# Patient Record
Sex: MALE | Race: WHITE | NOT HISPANIC OR LATINO | Employment: OTHER | URBAN - METROPOLITAN AREA
[De-identification: names, ages, dates, MRNs, and addresses within clinical notes are randomized per-mention and may not be internally consistent; named-entity substitution may affect disease eponyms.]

---

## 2017-11-25 ENCOUNTER — APPOINTMENT (OUTPATIENT)
Dept: GENERAL RADIOLOGY | Facility: HOSPITAL | Age: 62
End: 2017-11-25

## 2017-11-25 ENCOUNTER — HOSPITAL ENCOUNTER (EMERGENCY)
Facility: HOSPITAL | Age: 62
Discharge: HOME | End: 2017-11-26
Attending: EMERGENCY MEDICINE | Admitting: EMERGENCY MEDICINE

## 2017-11-25 DIAGNOSIS — S29.011A MUSCLE STRAIN OF CHEST WALL, INITIAL ENCOUNTER: Primary | ICD-10-CM

## 2017-11-25 PROCEDURE — 71101 X-RAY EXAM UNILAT RIBS/CHEST: CPT

## 2017-11-25 PROCEDURE — 99283 EMERGENCY DEPT VISIT LOW MDM: CPT

## 2017-11-25 RX ORDER — HYDROXYZINE PAMOATE 50 MG/1
100 CAPSULE ORAL 3 TIMES DAILY PRN
COMMUNITY

## 2017-11-25 RX ORDER — LOVASTATIN 40 MG/1
40 TABLET ORAL DAILY
COMMUNITY
Start: 2016-10-08

## 2017-11-25 RX ORDER — LAMOTRIGINE 100 MG/1
50 TABLET ORAL DAILY
COMMUNITY

## 2017-11-25 RX ORDER — QUETIAPINE FUMARATE 100 MG/1
200 TABLET, FILM COATED ORAL NIGHTLY
COMMUNITY

## 2017-11-25 RX ORDER — KETOCONAZOLE 20 MG/ML
1 SHAMPOO TOPICAL EVERY OTHER DAY
COMMUNITY
Start: 2016-10-25

## 2017-11-25 RX ORDER — DICYCLOMINE HYDROCHLORIDE 10 MG/1
10 CAPSULE ORAL 3 TIMES DAILY
COMMUNITY

## 2017-11-25 RX ORDER — OMEPRAZOLE 40 MG/1
40 CAPSULE, DELAYED RELEASE ORAL DAILY
COMMUNITY

## 2017-11-25 RX ORDER — FLUOROMETHOLONE 0.1 %
1 SUSPENSION, DROPS(FINAL DOSAGE FORM)(ML) OPHTHALMIC (EYE) DAILY
COMMUNITY
Start: 2016-08-11

## 2017-11-26 VITALS
HEART RATE: 72 BPM | HEIGHT: 68 IN | SYSTOLIC BLOOD PRESSURE: 108 MMHG | WEIGHT: 210 LBS | DIASTOLIC BLOOD PRESSURE: 69 MMHG | BODY MASS INDEX: 31.83 KG/M2 | TEMPERATURE: 98.3 F | OXYGEN SATURATION: 97 % | RESPIRATION RATE: 18 BRPM

## 2017-11-26 LAB
ATMOSPHERIC PRESS: 754.7 MMHG
BASE EXCESS BLDV CALC-SCNC: 7.4 MMOL/L
BDY SITE: ABNORMAL
GAS FLOW AIRWAY: 3 LPM
HCO3 BLDV-SCNC: 33 MMOL/L (ref 22–28)
MODALITY: ABNORMAL
PCO2 BLDV: 51.5 MM HG (ref 41–51)
PH BLDV: 7.42 PH UNITS (ref 7.31–7.41)
PO2 BLDV: 45 MM HG (ref 35–45)
SAO2 % BLDCOA: 80.3 % (ref 92–99)
TOTAL RATE: 23 BREATHS/MINUTE

## 2017-11-26 PROCEDURE — 82803 BLOOD GASES ANY COMBINATION: CPT

## 2017-11-26 RX ORDER — METHOCARBAMOL 750 MG/1
750 TABLET, FILM COATED ORAL 3 TIMES DAILY
Qty: 20 TABLET | Refills: 0 | Status: SHIPPED | OUTPATIENT
Start: 2017-11-26 | End: 2023-02-14

## 2017-11-26 NOTE — ED PROVIDER NOTES
"Pt presents to the ED complainign of R lower rib pain,w hcih began around 2100 today. Pt states that the pain began after he was reaching over a table when he heard a \"pop\" in his R ribs. Pt's R rib XR shows nothing acute. On exam, the pt has tenderness along the R costal margin. I agree with the plan to discharge the pt home with an incentive spirometer and a prescription for pain medication.     I supervised care provided by the midlevel provider.    We have discussed this patient's history, physical exam, and treatment plan.   I have reviewed the note and personally saw and examined the patient and agree with the plan of care.    Documentation assistance provided by emely Farmer for Dr. Cope.  Information recorded by the mauricioibe was done at my direction and has been verified and validated by me.       Shelli Farmer  11/26/17 0015       Garrett Cope MD  11/26/17 0327    "

## 2017-11-26 NOTE — DISCHARGE INSTRUCTIONS
Return to the ER with any further concerns or should your condition change or worsen.  Incentive spirometry as discussed.

## 2017-11-26 NOTE — ED PROVIDER NOTES
" EMERGENCY DEPARTMENT ENCOUNTER    CHIEF COMPLAINT  Chief Complaint: Rib pain  History given by: Patient  History limited by:   Room Number: 03/03  PMD: Arabella Valdez MD      HPI:  Pt is a 62 y.o. male who presents complaining of R sided rib pain that onset while lying down and attempting to stretch today at 2100. He reports hearing an audible \"pop\" sound when stretching followed by pain. He states the pain has improved since ED arrival, but originally had pain with movement and with deep breathing. Pt denies any cardiac pain.    Duration: 2 hours  Onset: sudden  Timing: constant  Location: R ribs  Radiation: none  Quality: \"pain\"  Intensity/Severity: moderate  Progression: improving  Associated Symptoms: none  Aggravating Factors: movement, deep breathing  Alleviating Factors: none  Previous Episodes: none  Treatment before arrival: none    PAST MEDICAL HISTORY  Active Ambulatory Problems     Diagnosis Date Noted   • No Active Ambulatory Problems     Resolved Ambulatory Problems     Diagnosis Date Noted   • No Resolved Ambulatory Problems     Past Medical History:   Diagnosis Date   • Diabetes 1.5, managed as type 2    • GERD (gastroesophageal reflux disease)    • Hyperlipidemia    • Hypertension    • Intercostal neuritis        PAST SURGICAL HISTORY  Past Surgical History:   Procedure Laterality Date   • BACK SURGERY     • CYST REMOVAL     • SINUS SURGERY      X3       FAMILY HISTORY  Family History   Problem Relation Age of Onset   • Diabetes Father    • Heart failure Father    • Cancer Sister    • Cancer Paternal Grandfather        SOCIAL HISTORY  Social History     Social History   • Marital status: Single     Spouse name: N/A   • Number of children: N/A   • Years of education: N/A     Occupational History   • Not on file.     Social History Main Topics   • Smoking status: Never Smoker   • Smokeless tobacco: Never Used   • Alcohol use No   • Drug use: No   • Sexual activity: Not on file     Other Topics " Concern   • Not on file     Social History Narrative       ALLERGIES  Flexeril [cyclobenzaprine]; Penicillins; and Sulfa antibiotics    REVIEW OF SYSTEMS  Review of Systems   Constitutional: Negative for fever.   Respiratory: Negative for shortness of breath.    Cardiovascular: Negative for chest pain.   Musculoskeletal: Positive for arthralgias (R ribs).       PHYSICAL EXAM  ED Triage Vitals   Temp Heart Rate Resp BP SpO2   11/25/17 2201 11/25/17 2201 11/25/17 2201 11/25/17 2218 11/25/17 2201   98.2 °F (36.8 °C) 92 18 134/69 99 %      Temp src Heart Rate Source Patient Position BP Location FiO2 (%)   11/25/17 2201 11/25/17 2201 -- -- --   Tympanic Monitor          Physical Exam   Constitutional: No distress.   HENT:   Head: Normocephalic and atraumatic.   Eyes: EOM are normal.   Neck: Normal range of motion.   Cardiovascular: Normal heart sounds.    Pulmonary/Chest: No respiratory distress. He exhibits tenderness (R anterior lateral ribs).   Abdominal: There is no tenderness.   Musculoskeletal: He exhibits no edema.   Neurological: He is alert.   Skin: Skin is warm and dry.   Nursing note and vitals reviewed.    RADIOLOGY  XR Ribs Right With PA Chest   Final Result   Negative right rib series.       This report was finalized on 11/25/2017 10:55 PM by Lucho Bravo MD.               I ordered the above noted radiological studies. Interpreted by radiologist.Reviewed by me in PACS.       PROCEDURES  Procedures      PROGRESS AND CONSULTS  ED Course   12:00 AM  Discussed with pt about how I feel this is most likely a chest wall strain. Will review the X-ray and if unremarkable, will discharge. Pt understands and agrees with plan. All concerns addressed.  12:09 AM  Reviewed pt's history and workup with Dr. Cope.  After a bedside evaluation; Dr Cope agrees with the plan of care          MEDICAL DECISION MAKING  Results were reviewed/discussed with the patient and they were also made aware of online access. Pt also made  aware that some labs, such as cultures, will not be resulted during ER visit and follow up with PMD is necessary.     MDM       DIAGNOSIS  Final diagnoses:   Muscle strain of chest wall, initial encounter       DISPOSITION  DISCHARGE    Patient discharged in stable condition.    Reviewed implications of results, diagnosis, meds, responsibility to follow up, warning signs and symptoms of possible worsening, potential complications and reasons to return to ER.    Patient/Family voiced understanding of above instructions.    Discussed plan for discharge, as there is no emergent indication for admission.  Pt/family is agreeable and understands need for follow up and repeat testing.  Pt is aware that discharge does not mean that nothing is wrong but it indicates no emergency is present that requires admission and they must continue care with follow-up as given below or physician of their choice.     FOLLOW-UP  Arabella Valdez MD  [address] [address]  [phone]    Call in 2 days  For Primary Physician follow-up, If symptoms worsen         Medication List      New Prescriptions          methocarbamol 750 MG tablet   Commonly known as:  ROBAXIN   Take 1 tablet by mouth 3 (Three) Times a Day.               Latest Documented Vital Signs:  As of 4:35 AM  BP- 108/69 HR- 72 Temp- 98.3 °F (36.8 °C) (Oral) O2 sat- 97%    --  Documentation assistance provided by emely Walters for Santo Villegas PA-C.  Information recorded by the scribe was done at my direction and has been verified and validated by me.     Prosper Walters  11/26/17 0010       JUNI Mitchell III  11/26/17 0435

## 2018-12-05 ENCOUNTER — TRANSCRIBE ORDERS (OUTPATIENT)
Dept: ADMINISTRATIVE | Facility: HOSPITAL | Age: 63
End: 2018-12-05

## 2018-12-05 ENCOUNTER — HOSPITAL ENCOUNTER (OUTPATIENT)
Dept: GENERAL RADIOLOGY | Facility: HOSPITAL | Age: 63
Discharge: HOME | End: 2018-12-05
Attending: UROLOGY

## 2018-12-05 ENCOUNTER — HOSPITAL ENCOUNTER (OUTPATIENT)
Dept: GENERAL RADIOLOGY | Facility: HOSPITAL | Age: 63
Discharge: HOME | End: 2018-12-05
Admitting: FAMILY MEDICINE

## 2018-12-05 DIAGNOSIS — Z87.442 PERSONAL HISTORY OF URINARY CALCULI: Primary | ICD-10-CM

## 2018-12-05 DIAGNOSIS — Z87.442 PERSONAL HISTORY OF URINARY CALCULI: ICD-10-CM

## 2018-12-05 DIAGNOSIS — M79.674 PAIN OF TOE OF RIGHT FOOT: Primary | ICD-10-CM

## 2018-12-05 DIAGNOSIS — M79.674 PAIN OF TOE OF RIGHT FOOT: ICD-10-CM

## 2018-12-05 PROCEDURE — 73630 X-RAY EXAM OF FOOT: CPT

## 2018-12-05 PROCEDURE — 74018 RADEX ABDOMEN 1 VIEW: CPT

## 2019-01-23 ENCOUNTER — TRANSCRIBE ORDERS (OUTPATIENT)
Dept: ADMINISTRATIVE | Facility: HOSPITAL | Age: 64
End: 2019-01-23

## 2019-01-23 DIAGNOSIS — R19.7 DIARRHEA, UNSPECIFIED TYPE: ICD-10-CM

## 2019-01-23 DIAGNOSIS — R63.4 LOSS OF WEIGHT: ICD-10-CM

## 2019-01-23 DIAGNOSIS — R10.9 STOMACH ACHE: Primary | ICD-10-CM

## 2021-03-22 ENCOUNTER — BULK ORDERING (OUTPATIENT)
Dept: CASE MANAGEMENT | Facility: OTHER | Age: 66
End: 2021-03-22

## 2021-03-22 DIAGNOSIS — Z23 IMMUNIZATION DUE: ICD-10-CM

## 2023-01-20 ENCOUNTER — HOSPITAL ENCOUNTER (EMERGENCY)
Facility: HOSPITAL | Age: 68
Discharge: HOME | End: 2023-01-20
Attending: EMERGENCY MEDICINE | Admitting: EMERGENCY MEDICINE
Payer: MEDICARE

## 2023-01-20 ENCOUNTER — APPOINTMENT (OUTPATIENT)
Dept: CT IMAGING | Facility: HOSPITAL | Age: 68
End: 2023-01-20
Payer: MEDICARE

## 2023-01-20 VITALS
HEART RATE: 72 BPM | TEMPERATURE: 97.5 F | RESPIRATION RATE: 16 BRPM | DIASTOLIC BLOOD PRESSURE: 95 MMHG | BODY MASS INDEX: 25.76 KG/M2 | WEIGHT: 170 LBS | HEIGHT: 68 IN | OXYGEN SATURATION: 90 % | SYSTOLIC BLOOD PRESSURE: 126 MMHG

## 2023-01-20 DIAGNOSIS — N35.919 STRICTURE OF MALE URETHRA, UNSPECIFIED STRICTURE TYPE: Primary | ICD-10-CM

## 2023-01-20 LAB
ALBUMIN SERPL-MCNC: 4.7 G/DL (ref 3.5–5.2)
ALBUMIN/GLOB SERPL: 2.5 G/DL
ALP SERPL-CCNC: 60 U/L (ref 39–117)
ALT SERPL W P-5'-P-CCNC: 43 U/L (ref 1–41)
ANION GAP SERPL CALCULATED.3IONS-SCNC: 13.5 MMOL/L (ref 5–15)
AST SERPL-CCNC: 31 U/L (ref 1–40)
BACTERIA UR QL AUTO: NORMAL /HPF
BASOPHILS # BLD AUTO: 0.02 10*3/MM3 (ref 0–0.2)
BASOPHILS NFR BLD AUTO: 0.3 % (ref 0–1.5)
BILIRUB SERPL-MCNC: 0.5 MG/DL (ref 0–1.2)
BILIRUB UR QL STRIP: NEGATIVE
BUN SERPL-MCNC: 28 MG/DL (ref 8–23)
BUN/CREAT SERPL: 23.9 (ref 7–25)
CALCIUM SPEC-SCNC: 9.8 MG/DL (ref 8.6–10.5)
CHLORIDE SERPL-SCNC: 108 MMOL/L (ref 98–107)
CLARITY UR: CLEAR
CO2 SERPL-SCNC: 21.5 MMOL/L (ref 22–29)
COLOR UR: ABNORMAL
CREAT SERPL-MCNC: 1.17 MG/DL (ref 0.76–1.27)
DEPRECATED RDW RBC AUTO: 46.9 FL (ref 37–54)
EGFRCR SERPLBLD CKD-EPI 2021: 67.9 ML/MIN/1.73
EOSINOPHIL # BLD AUTO: 0.16 10*3/MM3 (ref 0–0.4)
EOSINOPHIL NFR BLD AUTO: 2 % (ref 0.3–6.2)
ERYTHROCYTE [DISTWIDTH] IN BLOOD BY AUTOMATED COUNT: 13.7 % (ref 12.3–15.4)
GLOBULIN UR ELPH-MCNC: 1.9 GM/DL
GLUCOSE SERPL-MCNC: 109 MG/DL (ref 65–99)
GLUCOSE UR STRIP-MCNC: NEGATIVE MG/DL
HCT VFR BLD AUTO: 40.4 % (ref 37.5–51)
HGB BLD-MCNC: 13.5 G/DL (ref 13–17.7)
HGB UR QL STRIP.AUTO: NEGATIVE
HYALINE CASTS UR QL AUTO: NORMAL /LPF
KETONES UR QL STRIP: NEGATIVE
LEUKOCYTE ESTERASE UR QL STRIP.AUTO: NEGATIVE
LYMPHOCYTES # BLD AUTO: 1.77 10*3/MM3 (ref 0.7–3.1)
LYMPHOCYTES NFR BLD AUTO: 22.5 % (ref 19.6–45.3)
MCH RBC QN AUTO: 31.3 PG (ref 26.6–33)
MCHC RBC AUTO-ENTMCNC: 33.4 G/DL (ref 31.5–35.7)
MCV RBC AUTO: 93.7 FL (ref 79–97)
MONOCYTES # BLD AUTO: 0.6 10*3/MM3 (ref 0.1–0.9)
MONOCYTES NFR BLD AUTO: 7.6 % (ref 5–12)
NEUTROPHILS NFR BLD AUTO: 5.27 10*3/MM3 (ref 1.7–7)
NEUTROPHILS NFR BLD AUTO: 67.2 % (ref 42.7–76)
NITRITE UR QL STRIP: POSITIVE
PH UR STRIP.AUTO: 5.5 [PH] (ref 5–8)
PLATELET # BLD AUTO: 142 10*3/MM3 (ref 140–450)
PMV BLD AUTO: 10.9 FL (ref 6–12)
POTASSIUM SERPL-SCNC: 4.3 MMOL/L (ref 3.5–5.2)
PROT SERPL-MCNC: 6.6 G/DL (ref 6–8.5)
PROT UR QL STRIP: ABNORMAL
RBC # BLD AUTO: 4.31 10*6/MM3 (ref 4.14–5.8)
RBC # UR STRIP: NORMAL /HPF
REF LAB TEST METHOD: NORMAL
SODIUM SERPL-SCNC: 143 MMOL/L (ref 136–145)
SP GR UR STRIP: >=1.03 (ref 1–1.03)
SQUAMOUS #/AREA URNS HPF: NORMAL /HPF
UROBILINOGEN UR QL STRIP: ABNORMAL
WBC # UR STRIP: NORMAL /HPF
WBC NRBC COR # BLD: 7.85 10*3/MM3 (ref 3.4–10.8)

## 2023-01-20 PROCEDURE — 74176 CT ABD & PELVIS W/O CONTRAST: CPT

## 2023-01-20 PROCEDURE — 63710000001 ONDANSETRON ODT 4 MG TABLET DISPERSIBLE: Performed by: EMERGENCY MEDICINE

## 2023-01-20 PROCEDURE — P9612 CATHETERIZE FOR URINE SPEC: HCPCS

## 2023-01-20 PROCEDURE — 85025 COMPLETE CBC W/AUTO DIFF WBC: CPT | Performed by: EMERGENCY MEDICINE

## 2023-01-20 PROCEDURE — 99284 EMERGENCY DEPT VISIT MOD MDM: CPT

## 2023-01-20 PROCEDURE — 51798 US URINE CAPACITY MEASURE: CPT

## 2023-01-20 PROCEDURE — 80053 COMPREHEN METABOLIC PANEL: CPT | Performed by: EMERGENCY MEDICINE

## 2023-01-20 PROCEDURE — 81001 URINALYSIS AUTO W/SCOPE: CPT | Performed by: EMERGENCY MEDICINE

## 2023-01-20 RX ORDER — ONDANSETRON 4 MG/1
4 TABLET, ORALLY DISINTEGRATING ORAL ONCE
Status: COMPLETED | OUTPATIENT
Start: 2023-01-20 | End: 2023-01-20

## 2023-01-20 RX ORDER — LIDOCAINE HYDROCHLORIDE 20 MG/ML
JELLY TOPICAL ONCE
Status: COMPLETED | OUTPATIENT
Start: 2023-01-20 | End: 2023-01-20

## 2023-01-20 RX ORDER — TRAMADOL HYDROCHLORIDE 50 MG/1
50 TABLET ORAL EVERY 8 HOURS PRN
Qty: 12 TABLET | Refills: 0 | Status: SHIPPED | OUTPATIENT
Start: 2023-01-20 | End: 2023-02-14

## 2023-01-20 RX ORDER — HYDROCODONE BITARTRATE AND ACETAMINOPHEN 7.5; 325 MG/1; MG/1
1 TABLET ORAL ONCE
Status: COMPLETED | OUTPATIENT
Start: 2023-01-20 | End: 2023-01-20

## 2023-01-20 RX ORDER — LIDOCAINE HYDROCHLORIDE 20 MG/ML
JELLY TOPICAL ONCE
Status: DISCONTINUED | OUTPATIENT
Start: 2023-01-20 | End: 2023-01-20

## 2023-01-20 RX ADMIN — LIDOCAINE HYDROCHLORIDE: 20 JELLY TOPICAL at 17:43

## 2023-01-20 RX ADMIN — HYDROCODONE BITARTRATE AND ACETAMINOPHEN 1 TABLET: 7.5; 325 TABLET ORAL at 16:09

## 2023-01-20 RX ADMIN — ONDANSETRON 4 MG: 4 TABLET, ORALLY DISINTEGRATING ORAL at 16:10

## 2023-01-20 NOTE — ED PROVIDER NOTES
EMERGENCY DEPARTMENT ENCOUNTER    Room Number:  32/32  Date seen:  1/20/2023  PCP: Arabella Valdez MD  Historian: Patient      HPI:  Chief Complaint: Penile pain    A complete HPI/ROS/PMH/PSH/SH/FH are unobtainable due to: N/A    Context: Mega Mack is a 67 y.o. male who presents to the ED c/o penile and right inguinal pain for the past 3 weeks.  Symptoms of gotten progressively worse over the past few days.  He reports that he has dribbling of urination with frequency and dysuria.  His urine has been clear-no hematuria or dark urine.  No fevers or chills.  No nausea, vomiting or diarrhea.  He has had similar problems in the past when he has had kidney stones.      PAST MEDICAL HISTORY  Active Ambulatory Problems     Diagnosis Date Noted   • No Active Ambulatory Problems     Resolved Ambulatory Problems     Diagnosis Date Noted   • No Resolved Ambulatory Problems     Past Medical History:   Diagnosis Date   • Diabetes 1.5, managed as type 2 (HCC)    • GERD (gastroesophageal reflux disease)    • Hyperlipidemia    • Hypertension    • Intercostal neuritis          PAST SURGICAL HISTORY  Past Surgical History:   Procedure Laterality Date   • APPENDECTOMY  03/20/2019   • BACK SURGERY     • CYST REMOVAL     • SINUS SURGERY      X3         FAMILY HISTORY  Family History   Problem Relation Age of Onset   • Diabetes Father    • Heart failure Father    • Cancer Sister    • Cancer Paternal Grandfather          SOCIAL HISTORY  Social History     Socioeconomic History   • Marital status: Single   Tobacco Use   • Smoking status: Never   • Smokeless tobacco: Never   Substance and Sexual Activity   • Alcohol use: No   • Drug use: No         ALLERGIES  Sulfamethoxazole-trimethoprim, Dyphylline, Flexeril [cyclobenzaprine], Penicillins, and Sulfa antibiotics        REVIEW OF SYSTEMS  Review of Systems   Constitutional: Negative for fever.   Respiratory: Negative for shortness of breath.    Cardiovascular: Negative for chest  pain.   Gastrointestinal: Negative for diarrhea and vomiting.   Genitourinary: Positive for frequency, penile pain and urgency. Negative for flank pain and hematuria.            PHYSICAL EXAM  ED Triage Vitals [01/20/23 1541]   Temp Heart Rate Resp BP SpO2   97.5 °F (36.4 °C) 78 18 (!) 181/95 99 %      Temp src Heart Rate Source Patient Position BP Location FiO2 (%)   -- Monitor -- -- --       Physical Exam      Physical Exam   Constitutional: Pt. is oriented to person, place, and time.  He is well-developed, well-nourished, and in no distress.    HENT: Normocephalic and atraumatic. Pupils are equal, round, and reactive to light.   Neck: Normal range of motion. Neck supple. No JVD present. No tracheal deviation present.   Cardiovascular: Normal rate, regular rhythm and normal heart sounds.   Pulmonary/Chest: Effort normal and breath sounds normal. No stridor. No respiratory distress. No wheezes, no rales.   Abdominal: Soft.  No distension. There is no tenderness. There is no rebound and no guarding.   Musculoskeletal: No edema, tenderness or deformity.   Neurological: He is alert and oriented to person, place, and time.  He has no focal neurologic deficits.  Skin: Skin is warm and dry. Pt. is not diaphoretic.  Psychiatric: Mood, affect normal.  He is pleasant and cooperative.  Nursing note and vitals reviewed.            LAB RESULTS  Recent Results (from the past 24 hour(s))   POC Urinalysis Dipstick, Multipro (Automated Dipstick)    Collection Time: 01/20/23 12:56 PM    Specimen: Urine   Result Value Ref Range    Color Yellow     Clarity, UA Cloudy (A)     Glucose, UA Negative mg/dL    Bilirubin Negative     Ketones, UA Negative     Specific Gravity  1.030 1.005 - 1.030    Blood, UA Trace (A)     pH, Urine 5.5 5.0 - 8.0    Protein, POC 30 mg/dL (A) mg/dL    Urobilinogen, UA 0.2 E.U./dL     Nitrite, UA Negative     Leukocytes Negative    Comprehensive Metabolic Panel    Collection Time: 01/20/23  4:04 PM     Specimen: Blood   Result Value Ref Range    Glucose 109 (H) 65 - 99 mg/dL    BUN 28 (H) 8 - 23 mg/dL    Creatinine 1.17 0.76 - 1.27 mg/dL    Sodium 143 136 - 145 mmol/L    Potassium 4.3 3.5 - 5.2 mmol/L    Chloride 108 (H) 98 - 107 mmol/L    CO2 21.5 (L) 22.0 - 29.0 mmol/L    Calcium 9.8 8.6 - 10.5 mg/dL    Total Protein 6.6 6.0 - 8.5 g/dL    Albumin 4.7 3.5 - 5.2 g/dL    ALT (SGPT) 43 (H) 1 - 41 U/L    AST (SGOT) 31 1 - 40 U/L    Alkaline Phosphatase 60 39 - 117 U/L    Total Bilirubin 0.5 0.0 - 1.2 mg/dL    Globulin 1.9 gm/dL    A/G Ratio 2.5 g/dL    BUN/Creatinine Ratio 23.9 7.0 - 25.0    Anion Gap 13.5 5.0 - 15.0 mmol/L    eGFR 67.9 >60.0 mL/min/1.73   CBC Auto Differential    Collection Time: 01/20/23  4:04 PM    Specimen: Blood   Result Value Ref Range    WBC 7.85 3.40 - 10.80 10*3/mm3    RBC 4.31 4.14 - 5.80 10*6/mm3    Hemoglobin 13.5 13.0 - 17.7 g/dL    Hematocrit 40.4 37.5 - 51.0 %    MCV 93.7 79.0 - 97.0 fL    MCH 31.3 26.6 - 33.0 pg    MCHC 33.4 31.5 - 35.7 g/dL    RDW 13.7 12.3 - 15.4 %    RDW-SD 46.9 37.0 - 54.0 fl    MPV 10.9 6.0 - 12.0 fL    Platelets 142 140 - 450 10*3/mm3    Neutrophil % 67.2 42.7 - 76.0 %    Lymphocyte % 22.5 19.6 - 45.3 %    Monocyte % 7.6 5.0 - 12.0 %    Eosinophil % 2.0 0.3 - 6.2 %    Basophil % 0.3 0.0 - 1.5 %    Neutrophils, Absolute 5.27 1.70 - 7.00 10*3/mm3    Lymphocytes, Absolute 1.77 0.70 - 3.10 10*3/mm3    Monocytes, Absolute 0.60 0.10 - 0.90 10*3/mm3    Eosinophils, Absolute 0.16 0.00 - 0.40 10*3/mm3    Basophils, Absolute 0.02 0.00 - 0.20 10*3/mm3   Urinalysis With Culture If Indicated - Urine, Clean Catch    Collection Time: 01/20/23  4:09 PM    Specimen: Urine, Clean Catch   Result Value Ref Range    Color, UA Dark Yellow (A) Yellow, Straw    Appearance, UA Clear Clear    pH, UA 5.5 5.0 - 8.0    Specific Gravity, UA >=1.030 1.005 - 1.030    Glucose, UA Negative Negative    Ketones, UA Negative Negative    Bilirubin, UA Negative Negative    Blood, UA Negative Negative     Protein, UA Trace (A) Negative    Leuk Esterase, UA Negative Negative    Nitrite, UA Positive (A) Negative    Urobilinogen, UA 1.0 E.U./dL 0.2 - 1.0 E.U./dL   Urinalysis, Microscopic Only - Urine, Clean Catch    Collection Time: 01/20/23  4:09 PM    Specimen: Urine, Clean Catch   Result Value Ref Range    RBC, UA None Seen None Seen, 0-2 /HPF    WBC, UA 0-2 None Seen, 0-2 /HPF    Bacteria, UA None Seen None Seen /HPF    Squamous Epithelial Cells, UA 0-2 None Seen, 0-2 /HPF    Hyaline Casts, UA None Seen None Seen /LPF    Methodology Manual Light Microscopy        Ordered the above labs and reviewed the results.        RADIOLOGY  CT Abdomen Pelvis Without Contrast    Result Date: 1/20/2023  EXAMINATION: CT OF THE ABDOMEN AND PELVIS WITH CONTRAST  TECHNIQUE: Computed tomography of the abdomen and pelvis after the uneventful administration of nonionic intravenous contrast per protocol. Radiation dose reduction techniques were utilized, including automated exposure control and exposure modulation based on body size.  HISTORY: Right inguinal pain and kidney stones  COMPARISON: None available  FINDINGS: Limited evaluation of the inferior thorax demonstrates atelectasis, without consolidation, pleural effusion, or pneumothorax. The heart is normal in size, without pericardial effusion. Coronary calcifications are seen.  Gallbladder is absent. Nonobstructing stones are seen in the kidneys. No stones are seen in either ureter or in the urinary bladder, and there is no hydronephrosis demonstrated on either side. The Largest stone is on the right, measuring 4 mm. There is hepatic steatosis. The spleen, adrenal glands, pancreas, stomach, small bowel, urinary bladder, and abdominal vasculature are normal. Colonic diverticula are seen, without evidence of diverticulitis. There are radiation seeds in prostate. No intraperitoneal fluid collection or free gas are seen. No enlarged lymph nodes are demonstrated.  Bone windows  demonstrate degenerative changes, without suspicious osseous lesion seen.      Nonobstructing stones in the kidneys bilaterally. Additional incidental findings as above.  COMPARISON: None available  FINDINGS:  This report was finalized on 1/20/2023 5:13 PM by Dr. Luis M Ruiz M.D.        Ordered the above noted radiological studies. Reviewed by me in PACS.        PROCEDURES  Procedures      MEDICATIONS GIVEN IN ER  Medications   HYDROcodone-acetaminophen (NORCO) 7.5-325 MG per tablet 1 tablet (1 tablet Oral Given 1/20/23 1609)   ondansetron ODT (ZOFRAN-ODT) disintegrating tablet 4 mg (4 mg Oral Given 1/20/23 1610)   Lidocaine HCl gel (XYLOCAINE) urethral/mucosal syringe ( Topical Given 1/20/23 5955)             MEDICAL DECISION MAKING, PROGRESS, and CONSULTS    All labs have been independently reviewed by me.  All radiology studies have been reviewed by me and discussed with radiologist dictating the report.   EKG's independently viewed and interpreted by me.  Discussion below represents my analysis of pertinent findings related to patient's condition, differential diagnosis, treatment plan and final disposition.      Additional sources:  - Discussed/ obtained information from independent historians: No    - External (non-ED) record review: Patient has history of type 2 diabetes, GERD, hypertension and hyperlipidemia.  No recent ER visits or hospitalizations at [facility]    - Chronic or social conditions impacting care: None of which I am aware      Orders placed during this visit:  Orders Placed This Encounter   Procedures   • CT Abdomen Pelvis Without Contrast   • Comprehensive Metabolic Panel   • Urinalysis With Culture If Indicated - Urine, Clean Catch   • CBC Auto Differential   • Urinalysis, Microscopic Only - Urine, Clean Catch   • Bladder scan   • Straight Cath   • CBC & Differential       Additional orders considered but not ordered:  N/A    Differential diagnosis:  Abd Pain DDx includes  but is not limited to: Cholecystitis, choledocholithiasis, cholangitis, peritonitis, pancreatitis/pseudocyst, peptic ulcer, bowel obstruction/ileus, constipation, diverticulitis/perforation/abscess, inflammatory bowel disease, renal colic/stone, urinary tract infection/pyelonephritis, prostatitis, mesenteric ischemia, expanding/leaking AAA, testicular/ovarian torsion.      Independent interpretation of labs, radiology studies, and discussions with consultants:  ED Course as of 01/20/23 1753   Fri Jan 20, 2023   1615 500 cc post-void residual [WC]   1623 RN unable to straight cath, likely due to penile stricture. [WC]   1628 Nitrite, UA(!): Positive [WC]   1628 Protein, UA(!): Trace [WC]   1628 Color, UA(!): Dark Yellow [WC]   1637 CBC is normal. [WC]   1654 RBC, UA: None Seen [WC]   1654 Bacteria, UA: None Seen [WC]   1709 BUN(!): 28 [WC]   1710 Chloride(!): 108  CMP is otherwise unremarkable. [WC]   1718 CT of the abdomen pelvis was independently visualized by me and discussed with/interpreted by Dr. Ruiz (radiology)-there are bilateral nonobstructing renal stones but no ureteral or bladder stones.  No other acute processes are identified.  For official interpretation, see dictated report. [WC]      ED Course User Index  [WC] Simon Degroot MD              Appropriate PPE was worn by myself and the patient throughout our entire interaction.    DIAGNOSIS  Final diagnoses:   Stricture of male urethra, unspecified stricture type         DISPOSITION  Discharged            Latest Documented Vital Signs:  As of 17:53 EST  BP- 126/95 HR- 72 Temp- 97.5 °F (36.4 °C) O2 sat- 90%        --    Please note that portions of this were completed with a voice recognition program.       Note Disclaimer: At [facility], we believe that sharing information builds trust and better relationships. You are receiving this note because you are receiving care at [facility] or recently visited. It is possible you will see [facility]  information before a provider has talked with you about it. This kind of information can be easy to misunderstand. To help you fully understand what it means for your health, we urge you to discuss this note with your provider.           Simon Degroot MD  01/20/23 1549

## 2023-01-20 NOTE — ED NOTES
Unable to straight cath due to resistance and extreme pain. MD Degroot aware.    This RN in appropriate PPE while in pt room. Pt wearing mask

## 2023-01-20 NOTE — ED TRIAGE NOTES
Pt was brought in by ems from home for groin pain that has been intermittent today. Pt has hx of kidney stones and feels like stones    This RN wore mask and goggles during time of contact

## 2023-02-14 ENCOUNTER — PRE-ADMISSION TESTING (OUTPATIENT)
Dept: PREADMISSION TESTING | Facility: HOSPITAL | Age: 68
End: 2023-02-14
Payer: MEDICARE

## 2023-02-14 VITALS
DIASTOLIC BLOOD PRESSURE: 80 MMHG | TEMPERATURE: 97.9 F | BODY MASS INDEX: 30.69 KG/M2 | HEIGHT: 67 IN | SYSTOLIC BLOOD PRESSURE: 161 MMHG | OXYGEN SATURATION: 94 % | RESPIRATION RATE: 16 BRPM | WEIGHT: 195.5 LBS | HEART RATE: 84 BPM

## 2023-02-14 LAB — QT INTERVAL: 373 MS

## 2023-02-14 PROCEDURE — 93005 ELECTROCARDIOGRAM TRACING: CPT

## 2023-02-14 PROCEDURE — 93010 ELECTROCARDIOGRAM REPORT: CPT | Performed by: INTERNAL MEDICINE

## 2023-02-14 RX ORDER — HYDROCODONE BITARTRATE AND ACETAMINOPHEN 5; 325 MG/1; MG/1
1 TABLET ORAL EVERY 6 HOURS PRN
COMMUNITY

## 2023-02-14 RX ORDER — LAMOTRIGINE 100 MG/1
100 TABLET ORAL NIGHTLY
COMMUNITY

## 2023-02-14 RX ORDER — PROMETHAZINE HCL 50 MG
50 TABLET ORAL EVERY 6 HOURS PRN
COMMUNITY

## 2023-02-14 RX ORDER — LOVASTATIN 20 MG/1
20 TABLET ORAL DAILY
COMMUNITY
Start: 2023-02-07 | End: 2023-02-14

## 2023-02-14 RX ORDER — LOSARTAN POTASSIUM 25 MG/1
20 TABLET ORAL DAILY
COMMUNITY
Start: 2022-12-13

## 2023-02-14 RX ORDER — IBUPROFEN 800 MG/1
800 TABLET ORAL 4 TIMES DAILY PRN
COMMUNITY
Start: 2023-02-07

## 2023-02-14 NOTE — DISCHARGE INSTRUCTIONS
Take the following medications the morning of surgery with a small sip of water: OMEPRAZOLE, ZOLOFT, HYDROXYZINE, LAMOTRIGINE      If you are on prescription narcotic pain medication to control your pain you may also take that medication the morning of surgery.    ARRIVAL TIME: 5:30 AM ON 2/20/23    General Instructions:  Do not eat or drink anything after midnight the night before surgery.  Infants may have breast milk up to four hours before surgery.  Infants drinking formula may drink formula up to six hours before surgery.   Patients who avoid smoking, chewing tobacco and alcohol for 4 weeks prior to surgery have a reduced risk of post-operative complications.  Quit smoking as many days before surgery as you can.  Do not smoke, use chewing tobacco or drink alcohol the day of surgery.   If applicable bring your C-PAP/ BI-PAP machine.  Bring any papers given to you in the doctor’s office.  Wear clean comfortable clothes.  Do not wear contact lenses, false eyelashes or make-up.  Bring a case for your glasses.   Bring crutches or walker if applicable.  Remove all piercings.  Leave jewelry and any other valuables at home.  Hair extensions with metal clips must be removed prior to surgery.  The Pre-Admission Testing nurse will instruct you to bring medications if unable to obtain an accurate list in Pre-Admission Testing.        If you were given a blood bank ID arm band remember to bring it with you the day of surgery.    Preventing a Surgical Site Infection:  For 2 to 3 days before surgery, avoid shaving with a razor because the razor can irritate skin and make it easier to develop an infection.    Any areas of open skin can increase the risk of a post-operative wound infection by allowing bacteria to enter and travel throughout the body.  Notify your surgeon if you have any skin wounds / rashes even if it is not near the expected surgical site.  The area will need assessed to determine if surgery should be delayed  until it is healed.  The night prior to surgery shower using a fresh bar of anti-bacterial soap (such as Dial) and clean washcloth.  Sleep in a clean bed with clean clothing.  Do not allow pets to sleep with you.  Shower on the morning of surgery using a fresh bar of anti-bacterial soap (such as Dial) and clean washcloth.  Dry with a clean towel and dress in clean clothing.  Ask your surgeon if you will be receiving antibiotics prior to surgery.  Make sure you, your family, and all healthcare providers clean their hands with soap and water or an alcohol based hand  before caring for you or your wound.    Day of surgery:  Your arrival time is approximately two hours before your scheduled surgery time.  Upon arrival, a Pre-op nurse and Anesthesiologist will review your health history, obtain vital signs, and answer questions you may have.  The only belongings needed at this time will be your home medications and if applicable your C-PAP/BI-PAP machine.  A Pre-op nurse will start an IV and you may receive medication in preparation for surgery, including something to help you relax.      Please be aware that surgery does come with discomfort.  We want to make every effort to control your discomfort so please discuss any uncontrolled symptoms with your nurse.   Your doctor will most likely have prescribed pain medications.      If you are going home after surgery you will receive individualized written care instructions before being discharged.  A responsible adult must drive you to and from the hospital on the day of your surgery and stay with you for 24 hours.  Discharge prescriptions can be filled by the hospital pharmacy during regular pharmacy hours.  If you are having surgery late in the day/evening your prescription may be e-prescribed to your pharmacy.  Please verify your pharmacy hours or chose a 24 hour pharmacy to avoid not having access to your prescription because your pharmacy has closed for the  day.    If you are staying overnight following surgery, you will be transported to your hospital room following the recovery period.  [facility] has all private rooms.    If you have any questions please call Pre-Admission Testing at [phone].  Deductibles and co-payments are collected on the day of service. Please be prepared to pay the required co-pay, deductible or deposit on the day of service as defined by your plan.    Call your surgeon immediately if you experience any of the following symptoms:  Sore Throat  Shortness of Breath or difficulty breathing  Cough  Chills  Body soreness or muscle pain  Headache  Fever  New loss of taste or smell  Do not arrive for your surgery ill.  Your procedure will need to be rescheduled to another time.  You will need to call your physician before the day of surgery to avoid any unnecessary exposure to hospital staff as well as other patients.

## 2023-02-20 ENCOUNTER — HOSPITAL ENCOUNTER (OUTPATIENT)
Facility: HOSPITAL | Age: 68
Setting detail: HOSPITAL OUTPATIENT SURGERY
Discharge: HOME | End: 2023-02-20
Attending: UROLOGY | Admitting: UROLOGY
Payer: MEDICARE

## 2023-02-20 ENCOUNTER — APPOINTMENT (OUTPATIENT)
Dept: GENERAL RADIOLOGY | Facility: HOSPITAL | Age: 68
End: 2023-02-20
Payer: MEDICARE

## 2023-02-20 ENCOUNTER — ANESTHESIA EVENT (OUTPATIENT)
Dept: PERIOP | Facility: HOSPITAL | Age: 68
End: 2023-02-20
Payer: MEDICARE

## 2023-02-20 ENCOUNTER — ANESTHESIA (OUTPATIENT)
Dept: PERIOP | Facility: HOSPITAL | Age: 68
End: 2023-02-20
Payer: MEDICARE

## 2023-02-20 VITALS
HEART RATE: 68 BPM | SYSTOLIC BLOOD PRESSURE: 145 MMHG | OXYGEN SATURATION: 95 % | DIASTOLIC BLOOD PRESSURE: 88 MMHG | RESPIRATION RATE: 16 BRPM | TEMPERATURE: 98.4 F

## 2023-02-20 DIAGNOSIS — N35.812 OTHER STRICTURE OF BULBOUS URETHRA IN MALE: Primary | ICD-10-CM

## 2023-02-20 PROBLEM — N35.919 URETHRAL STRICTURE: Status: ACTIVE | Noted: 2023-02-20

## 2023-02-20 LAB
GLUCOSE BLDC GLUCOMTR-MCNC: 114 MG/DL (ref 70–130)
GLUCOSE BLDC GLUCOMTR-MCNC: 118 MG/DL (ref 70–130)

## 2023-02-20 PROCEDURE — 25010000002 ONDANSETRON PER 1 MG: Performed by: NURSE ANESTHETIST, CERTIFIED REGISTERED

## 2023-02-20 PROCEDURE — 74018 RADEX ABDOMEN 1 VIEW: CPT

## 2023-02-20 PROCEDURE — C2627 CATH, SUPRAPUBIC/CYSTOSCOPIC: HCPCS | Performed by: UROLOGY

## 2023-02-20 PROCEDURE — 25010000002 PROPOFOL 10 MG/ML EMULSION: Performed by: NURSE ANESTHETIST, CERTIFIED REGISTERED

## 2023-02-20 PROCEDURE — 76000 FLUOROSCOPY <1 HR PHYS/QHP: CPT

## 2023-02-20 PROCEDURE — 25010000002 FENTANYL CITRATE (PF) 50 MCG/ML SOLUTION: Performed by: NURSE ANESTHETIST, CERTIFIED REGISTERED

## 2023-02-20 PROCEDURE — 25010000002 MIDAZOLAM PER 1 MG: Performed by: ANESTHESIOLOGY

## 2023-02-20 PROCEDURE — 25010000002 DEXAMETHASONE SODIUM PHOSPHATE 20 MG/5ML SOLUTION: Performed by: NURSE ANESTHETIST, CERTIFIED REGISTERED

## 2023-02-20 PROCEDURE — C1769 GUIDE WIRE: HCPCS | Performed by: UROLOGY

## 2023-02-20 PROCEDURE — 82962 GLUCOSE BLOOD TEST: CPT

## 2023-02-20 RX ORDER — ONDANSETRON 2 MG/ML
4 INJECTION INTRAMUSCULAR; INTRAVENOUS ONCE AS NEEDED
Status: DISCONTINUED | OUTPATIENT
Start: 2023-02-20 | End: 2023-02-20 | Stop reason: HOSPADM

## 2023-02-20 RX ORDER — CLINDAMYCIN PHOSPHATE 900 MG/50ML
900 INJECTION INTRAVENOUS
Status: COMPLETED | OUTPATIENT
Start: 2023-02-20 | End: 2023-02-20

## 2023-02-20 RX ORDER — EPHEDRINE SULFATE 50 MG/ML
5 INJECTION, SOLUTION INTRAVENOUS ONCE AS NEEDED
Status: DISCONTINUED | OUTPATIENT
Start: 2023-02-20 | End: 2023-02-20 | Stop reason: HOSPADM

## 2023-02-20 RX ORDER — MAGNESIUM HYDROXIDE 1200 MG/15ML
LIQUID ORAL AS NEEDED
Status: DISCONTINUED | OUTPATIENT
Start: 2023-02-20 | End: 2023-02-20 | Stop reason: HOSPADM

## 2023-02-20 RX ORDER — PHENYLEPHRINE HCL IN 0.9% NACL 1 MG/10 ML
SYRINGE (ML) INTRAVENOUS AS NEEDED
Status: DISCONTINUED | OUTPATIENT
Start: 2023-02-20 | End: 2023-02-20 | Stop reason: SURG

## 2023-02-20 RX ORDER — FENTANYL CITRATE 50 UG/ML
50 INJECTION, SOLUTION INTRAMUSCULAR; INTRAVENOUS
Status: DISCONTINUED | OUTPATIENT
Start: 2023-02-20 | End: 2023-02-20 | Stop reason: HOSPADM

## 2023-02-20 RX ORDER — PROPOFOL 10 MG/ML
VIAL (ML) INTRAVENOUS AS NEEDED
Status: DISCONTINUED | OUTPATIENT
Start: 2023-02-20 | End: 2023-02-20 | Stop reason: SURG

## 2023-02-20 RX ORDER — NALOXONE HCL 0.4 MG/ML
0.2 VIAL (ML) INJECTION AS NEEDED
Status: DISCONTINUED | OUTPATIENT
Start: 2023-02-20 | End: 2023-02-20 | Stop reason: HOSPADM

## 2023-02-20 RX ORDER — FAMOTIDINE 10 MG/ML
20 INJECTION, SOLUTION INTRAVENOUS ONCE
Status: COMPLETED | OUTPATIENT
Start: 2023-02-20 | End: 2023-02-20

## 2023-02-20 RX ORDER — PHENAZOPYRIDINE HYDROCHLORIDE 200 MG/1
200 TABLET, FILM COATED ORAL 3 TIMES DAILY PRN
Qty: 30 TABLET | Refills: 1 | Status: SHIPPED | OUTPATIENT
Start: 2023-02-20

## 2023-02-20 RX ORDER — MIDAZOLAM HYDROCHLORIDE 1 MG/ML
0.5 INJECTION INTRAMUSCULAR; INTRAVENOUS
Status: COMPLETED | OUTPATIENT
Start: 2023-02-20 | End: 2023-02-20

## 2023-02-20 RX ORDER — HYDROMORPHONE HYDROCHLORIDE 1 MG/ML
0.5 INJECTION, SOLUTION INTRAMUSCULAR; INTRAVENOUS; SUBCUTANEOUS
Status: DISCONTINUED | OUTPATIENT
Start: 2023-02-20 | End: 2023-02-20 | Stop reason: HOSPADM

## 2023-02-20 RX ORDER — SODIUM CHLORIDE 0.9 % (FLUSH) 0.9 %
3 SYRINGE (ML) INJECTION EVERY 12 HOURS SCHEDULED
Status: DISCONTINUED | OUTPATIENT
Start: 2023-02-20 | End: 2023-02-20 | Stop reason: HOSPADM

## 2023-02-20 RX ORDER — LIDOCAINE HYDROCHLORIDE 10 MG/ML
0.5 INJECTION, SOLUTION EPIDURAL; INFILTRATION; INTRACAUDAL; PERINEURAL ONCE AS NEEDED
Status: DISCONTINUED | OUTPATIENT
Start: 2023-02-20 | End: 2023-02-20 | Stop reason: HOSPADM

## 2023-02-20 RX ORDER — SODIUM CHLORIDE 0.9 % (FLUSH) 0.9 %
3-10 SYRINGE (ML) INJECTION AS NEEDED
Status: DISCONTINUED | OUTPATIENT
Start: 2023-02-20 | End: 2023-02-20 | Stop reason: HOSPADM

## 2023-02-20 RX ORDER — HYDRALAZINE HYDROCHLORIDE 20 MG/ML
5 INJECTION INTRAMUSCULAR; INTRAVENOUS
Status: DISCONTINUED | OUTPATIENT
Start: 2023-02-20 | End: 2023-02-20 | Stop reason: HOSPADM

## 2023-02-20 RX ORDER — OXYCODONE AND ACETAMINOPHEN 7.5; 325 MG/1; MG/1
1 TABLET ORAL EVERY 4 HOURS PRN
Status: DISCONTINUED | OUTPATIENT
Start: 2023-02-20 | End: 2023-02-20 | Stop reason: HOSPADM

## 2023-02-20 RX ORDER — DIPHENHYDRAMINE HYDROCHLORIDE 50 MG/ML
12.5 INJECTION INTRAMUSCULAR; INTRAVENOUS
Status: DISCONTINUED | OUTPATIENT
Start: 2023-02-20 | End: 2023-02-20 | Stop reason: HOSPADM

## 2023-02-20 RX ORDER — LABETALOL HYDROCHLORIDE 5 MG/ML
5 INJECTION, SOLUTION INTRAVENOUS
Status: DISCONTINUED | OUTPATIENT
Start: 2023-02-20 | End: 2023-02-20 | Stop reason: HOSPADM

## 2023-02-20 RX ORDER — CLINDAMYCIN PHOSPHATE 600 MG/50ML
600 INJECTION INTRAVENOUS
Status: DISCONTINUED | OUTPATIENT
Start: 2023-02-20 | End: 2023-02-20

## 2023-02-20 RX ORDER — NITROFURANTOIN 25; 75 MG/1; MG/1
100 CAPSULE ORAL EVERY 12 HOURS PRN
Qty: 10 CAPSULE | Refills: 0 | Status: SHIPPED | OUTPATIENT
Start: 2023-02-20

## 2023-02-20 RX ORDER — DIPHENHYDRAMINE HCL 25 MG
25 CAPSULE ORAL
Status: DISCONTINUED | OUTPATIENT
Start: 2023-02-20 | End: 2023-02-20 | Stop reason: HOSPADM

## 2023-02-20 RX ORDER — ONDANSETRON 2 MG/ML
INJECTION INTRAMUSCULAR; INTRAVENOUS AS NEEDED
Status: DISCONTINUED | OUTPATIENT
Start: 2023-02-20 | End: 2023-02-20 | Stop reason: SURG

## 2023-02-20 RX ORDER — SODIUM CHLORIDE, SODIUM LACTATE, POTASSIUM CHLORIDE, CALCIUM CHLORIDE 600; 310; 30; 20 MG/100ML; MG/100ML; MG/100ML; MG/100ML
9 INJECTION, SOLUTION INTRAVENOUS CONTINUOUS
Status: DISCONTINUED | OUTPATIENT
Start: 2023-02-20 | End: 2023-02-20 | Stop reason: HOSPADM

## 2023-02-20 RX ORDER — HYDROCODONE BITARTRATE AND ACETAMINOPHEN 7.5; 325 MG/1; MG/1
1 TABLET ORAL ONCE AS NEEDED
Status: COMPLETED | OUTPATIENT
Start: 2023-02-20 | End: 2023-02-20

## 2023-02-20 RX ORDER — LIDOCAINE HYDROCHLORIDE 20 MG/ML
INJECTION, SOLUTION INFILTRATION; PERINEURAL AS NEEDED
Status: DISCONTINUED | OUTPATIENT
Start: 2023-02-20 | End: 2023-02-20 | Stop reason: SURG

## 2023-02-20 RX ORDER — FLUMAZENIL 0.1 MG/ML
0.2 INJECTION INTRAVENOUS AS NEEDED
Status: DISCONTINUED | OUTPATIENT
Start: 2023-02-20 | End: 2023-02-20 | Stop reason: HOSPADM

## 2023-02-20 RX ORDER — FENTANYL CITRATE 50 UG/ML
INJECTION, SOLUTION INTRAMUSCULAR; INTRAVENOUS AS NEEDED
Status: DISCONTINUED | OUTPATIENT
Start: 2023-02-20 | End: 2023-02-20 | Stop reason: SURG

## 2023-02-20 RX ORDER — DEXAMETHASONE SODIUM PHOSPHATE 4 MG/ML
INJECTION, SOLUTION INTRA-ARTICULAR; INTRALESIONAL; INTRAMUSCULAR; INTRAVENOUS; SOFT TISSUE AS NEEDED
Status: DISCONTINUED | OUTPATIENT
Start: 2023-02-20 | End: 2023-02-20 | Stop reason: SURG

## 2023-02-20 RX ORDER — PROMETHAZINE HYDROCHLORIDE 25 MG/1
25 TABLET ORAL ONCE AS NEEDED
Status: DISCONTINUED | OUTPATIENT
Start: 2023-02-20 | End: 2023-02-20 | Stop reason: HOSPADM

## 2023-02-20 RX ORDER — PROMETHAZINE HYDROCHLORIDE 25 MG/1
25 SUPPOSITORY RECTAL ONCE AS NEEDED
Status: DISCONTINUED | OUTPATIENT
Start: 2023-02-20 | End: 2023-02-20 | Stop reason: HOSPADM

## 2023-02-20 RX ADMIN — PROPOFOL 200 MG: 10 INJECTION, EMULSION INTRAVENOUS at 07:59

## 2023-02-20 RX ADMIN — Medication 200 MCG: at 08:35

## 2023-02-20 RX ADMIN — ONDANSETRON 4 MG: 2 INJECTION INTRAMUSCULAR; INTRAVENOUS at 07:32

## 2023-02-20 RX ADMIN — HYDROCODONE BITARTRATE AND ACETAMINOPHEN 1 TABLET: 7.5; 325 TABLET ORAL at 09:07

## 2023-02-20 RX ADMIN — FENTANYL CITRATE 50 MCG: 50 INJECTION, SOLUTION INTRAMUSCULAR; INTRAVENOUS at 08:10

## 2023-02-20 RX ADMIN — CLINDAMYCIN IN 5 PERCENT DEXTROSE 900 MG: 18 INJECTION, SOLUTION INTRAVENOUS at 07:49

## 2023-02-20 RX ADMIN — MIDAZOLAM 0.5 MG: 1 INJECTION INTRAMUSCULAR; INTRAVENOUS at 07:20

## 2023-02-20 RX ADMIN — MIDAZOLAM 0.5 MG: 1 INJECTION INTRAMUSCULAR; INTRAVENOUS at 06:38

## 2023-02-20 RX ADMIN — Medication 200 MCG: at 08:28

## 2023-02-20 RX ADMIN — Medication 200 MCG: at 08:19

## 2023-02-20 RX ADMIN — FENTANYL CITRATE 50 MCG: 50 INJECTION, SOLUTION INTRAMUSCULAR; INTRAVENOUS at 09:23

## 2023-02-20 RX ADMIN — Medication 200 MCG: at 07:32

## 2023-02-20 RX ADMIN — Medication 200 MCG: at 08:23

## 2023-02-20 RX ADMIN — SODIUM CHLORIDE, POTASSIUM CHLORIDE, SODIUM LACTATE AND CALCIUM CHLORIDE 9 ML/HR: 600; 310; 30; 20 INJECTION, SOLUTION INTRAVENOUS at 06:38

## 2023-02-20 RX ADMIN — FAMOTIDINE 20 MG: 10 INJECTION INTRAVENOUS at 06:38

## 2023-02-20 RX ADMIN — DEXAMETHASONE SODIUM PHOSPHATE 8 MG: 4 INJECTION, SOLUTION INTRAMUSCULAR; INTRAVENOUS at 08:05

## 2023-02-20 RX ADMIN — LIDOCAINE HYDROCHLORIDE 100 MG: 20 INJECTION, SOLUTION INFILTRATION; PERINEURAL at 07:59

## 2023-02-20 NOTE — ANESTHESIA POSTPROCEDURE EVALUATION
Patient: Mega Mack    Procedure Summary     Date: 02/20/23 Room / Location: [facility] OR 01 / [facility] MAIN OR    Anesthesia Start: 0752 Anesthesia Stop: 0850    Procedure: CYSTOSCOPY with dilation of urethral stricture wiht catheter placement (Urethra) Diagnosis:     Surgeons: Samuel Lopez MD Provider: Samuel Smith MD    Anesthesia Type: general ASA Status: 2          Anesthesia Type: general    Vitals  Vitals Value Taken Time   /81 02/20/23 0946   Temp 36.9 °C (98.4 °F) 02/20/23 0844   Pulse 66 02/20/23 0952   Resp 16 02/20/23 0945   SpO2 94 % 02/20/23 0952   Vitals shown include unvalidated device data.        Post Anesthesia Care and Evaluation    Patient location during evaluation: PACU  Patient participation: complete - patient participated  Level of consciousness: awake and alert  Pain management: adequate    Airway patency: patent  Anesthetic complications: No anesthetic complications    Cardiovascular status: acceptable  Respiratory status: acceptable  Hydration status: acceptable    Comments: --------------------            02/20/23               0958     --------------------   BP:       138/83     Pulse:      68       Resp:       16       Temp:                SpO2:      93%      --------------------

## 2023-02-20 NOTE — DISCHARGE INSTRUCTIONS
"Urology - Post-Operative Instructions for Removal of Indwelling Urinary Catheter    ------Your doctor may instruct you to remove your catheter. Do not remove unless instructed to do so.------    The catheter is kept in your bladder by a small water-filled balloon. In order to remove the catheter, you must first let the water out of the balloon.    1. Attach a syringe (without a needle) to the unconnected end of the “Y” shaped catheter tubing (labeled \"balloon port\").    2. Gently pull back on the syringe plunger. The syringe will fill with water. When the syringe is full, empty it into a basin.     3. Repeat until the plunger is pulled and no more water comes into the syringe. The balloon at the end of the catheter will deflate as the water is removed.     4. The catheter can now be easily removed. Gently remove the catheter and place it in the garbage.        Fall catheter care instructions:  -- Always wash your hands before and after doing catheter care. Use soap and warm water.      -- Keep your skin and catheter clean. Clean the skin around your catheter at least once each day. Clean your skin area and catheter after every bowel movement (BM).      -- Always keep your urine bag below the level of your bladder. Backflow of urine can cause an infection.      -- Drink plenty of liquids. Drink at least 8 cups of healthy liquids each day.       -- Do not tug or pull on the tubing. This can cause bleeding and hurt your urethra.      -- The drainage bag should be emptied only when it is full enough that this is needed.       -- Empty full-sized bags every eight hours, and smaller (leg) bags every 3 to 4 hours, or when they are full.       -- Call the Urology clinic if the catheter is not draining, if it starts leaking, if the urine is thick and cloudy or has blood in it, if there is no urine in 6-8 hours, if you have fever (over 101°F) or chills, or if you have pain or burning in your urethra, bladder, or " abdom

## 2023-02-20 NOTE — ADDENDUM NOTE
Addendum  created 02/20/23 1513 by Samuel Smith MD    Attestation recorded in Intraprocedure, Intraprocedure Attestations filed

## 2023-02-20 NOTE — H&P
[facility] H&P      Patient Identification:  NAME:  Mega Mack  Age:  67 y.o.   Sex:  male   :  1955   MRN:  [id number]       Chief complaint:  Unable to urinate    History of present illness:      68 yo male with reduced FOS and difficulty urinating.  Went to ER but unable to pass Naranjo.  He has voided a bit since then but poor stream and discomfort.  Here for cysto, possible dilation of urethral stricture, naranjo placement.    Past medical history:  Past Medical History:   Diagnosis Date   • Anxiety    • Depression    • Dermatitis     ON FACE   • Diabetes 1.5, managed as type 2 ([facility])    • Dizzy spells    • GERD (gastroesophageal reflux disease)    • Hyperlipidemia    • Hypertension    • Intercostal neuritis    • Lumbar back pain    • Post traumatic stress disorder     NO PROBLEMS WITH ANESTHESIA   • Prostate cancer ([facility])     JUST MONITORING LEVELS AT THIS TIME   • Renal calculus or stone     CURRENT AND HX   • Sinus congestion    • Sleep apnea     DOES NOT USE MACHINE   • Sprain     LEFT RING FINGER   • Urethral stricture        Past surgical history:  Past Surgical History:   Procedure Laterality Date   • BACK SURGERY      LUMBAR   • LAPAROSCOPIC CHOLECYSTECTOMY     • SINUS SURGERY      X4   • WRIST GANGLION EXCISION Right        Allergies:  Penicillins, Sulfamethoxazole-trimethoprim, Chocolate, Dyphylline, Yeast-related products, and Flexeril [cyclobenzaprine]    Home medications:  Medications Prior to Admission   Medication Sig Dispense Refill Last Dose   • hydrOXYzine (VISTARIL) 50 MG capsule Take 100 mg by mouth 3 (Three) Times a Day As Needed for Itching.   2023 at 0100   • lamoTRIgine (LaMICtal) 100 MG tablet Take 50 mg by mouth Daily.   2023 at 0100   • lamoTRIgine (LaMICtal) 100 MG tablet Take 100 mg by mouth Every Night.   2023 at 2100   • losartan (COZAAR) 25 MG tablet Take 20 mg by mouth Daily.   2023 at 2100   • lovastatin (MEVACOR) 40 MG tablet Take 40 mg by  mouth Daily.   2/20/2023 at 0100   • metFORMIN (GLUCOPHAGE) 1000 MG tablet Take 500 mg by mouth 2 (Two) Times a Day.   2/20/2023 at 0100   • omeprazole (priLOSEC) 40 MG capsule Take 40 mg by mouth Daily.   2/19/2023 at 2100   • QUEtiapine (SEROquel) 100 MG tablet Take 200 mg by mouth Every Night.   2/20/2023 at 0100   • sertraline (ZOLOFT) 25 MG tablet Take 100 mg by mouth Daily.   2/20/2023 at 0100   • Unable to find Take 1 each by mouth At Night As Needed. Med Name: HYDROCHLORIDE   2/20/2023 at 0100   • Acetaminophen (ACETAMIN PO) Take 200 mg by mouth 5 (Five) Times a Day As Needed.   2/12/2023   • dicyclomine (BENTYL) 10 MG capsule Take 10 mg by mouth 3 (Three) Times a Day.   More than a month   • fluorometholone (FML) 0.1 % ophthalmic suspension Administer 1 drop to both eyes Daily.   2/5/2023   • Fluticasone Propionate (FLONASE ALLERGY RELIEF NA) 1 Squirt into the nostril(s) as directed by provider Daily As Needed.   More than a month   • HYDROcodone-acetaminophen (NORCO) 5-325 MG per tablet Take 1 tablet by mouth Every 6 (Six) Hours As Needed.   2/12/2023   • ibuprofen (ADVIL,MOTRIN) 800 MG tablet Take 800 mg by mouth 4 (Four) Times a Day As Needed. HELD FOR OR   2/13/2023   • ketoconazole (NIZORAL) 2 % shampoo Apply 1 application topically Every Other Day.   More than a month   • mupirocin (BACTROBAN) 2 % ointment Apply 1 application topically to the appropriate area as directed 4 (Four) Times a Day As Needed.   2/15/2023   • Phenazopyridine HCl (AZO TABS PO) Take 0.4 mg by mouth Daily.   More than a month   • promethazine (PHENERGAN) 50 MG tablet Take 50 mg by mouth Every 6 (Six) Hours As Needed for Nausea or Vomiting.   More than a month        Hospital medications:  clindamycin, 600 mg, Intravenous, On Call to OR  sodium chloride, 3 mL, Intravenous, Q12H      lactated ringers, 9 mL/hr, Last Rate: 9 mL/hr (02/20/23 0729)      •  fentanyl  •  lidocaine PF 1%  •  sodium chloride    Family history:  Family  History   Problem Relation Age of Onset   • Diabetes Father    • Heart failure Father    • Cancer Sister    • Cancer Paternal Grandfather        Social history:  Social History     Tobacco Use   • Smoking status: Never   • Smokeless tobacco: Never   Vaping Use   • Vaping Use: Former   Substance Use Topics   • Alcohol use: No   • Drug use: No       Review of systems:      Positive for:  nothing  Negative for:  Chest pain, cough, sob, o/w neg    Objective:  TMax 24 hours:   Temp (24hrs), Av.4 °F (36.9 °C), Min:98.4 °F (36.9 °C), Max:98.4 °F (36.9 °C)      Vitals Ranges:   Temp:  [98.4 °F (36.9 °C)] 98.4 °F (36.9 °C)  Heart Rate:  [73-76] 73  Resp:  [16] 16  BP: (113)/(78) 113/78    Intake/Output Last 3 shifts:  No intake/output data recorded.     Physical Exam:    General Appearance:    Alert, cooperative, NAD   Lungs:     Respirations unlabored, no wheezing    Heart:    RRR, intact peripheral pulses   Abdomen:     Soft, NDNT, no masses, no guarding   Neuro/Psych:   Orientation intact, mood/affect pleasant, no focal findings       Results review:   I reviewed the patient's new clinical results.    Data review:  Lab Results (last 24 hours)     Procedure Component Value Units Date/Time    POC Glucose Once [[id number]]  (Normal) Collected: 23 0555    Specimen: Blood Updated: 23 05     Glucose 114 mg/dL      Comment: Meter: [id number] : [id number] Lalit LANDERS              Imaging:  Imaging Results (Last 24 Hours)     ** No results found for the last 24 hours. **             Assessment:       * No active hospital problems. *    Urinary retention  Poss urethral stricture  H/o CaP    Plan:     Cysto, possible dilation of urethral stricture, naranjo placement  -RBO explained, to OR  -Understands I am uncertain as to his diagnosis at this time  -IV cleocin octor  -May need staged procedure    Samuel Lopez MD  23  07:42 EST

## 2023-02-20 NOTE — ANESTHESIA PREPROCEDURE EVALUATION
Anesthesia Evaluation     Patient summary reviewed and Nursing notes reviewed   NPO Solid Status: > 8 hours  NPO Liquid Status: > 4 hours           Airway   Mallampati: II  Neck ROM: full  No difficulty expected  Dental - normal exam     Pulmonary     breath sounds clear to auscultation  (+) sleep apnea,   Cardiovascular     Rhythm: regular    (+) hypertension, hyperlipidemia,       Neuro/Psych  (+) dizziness/light headedness, numbness, psychiatric history Depression, Anxiety and PTSD,    GI/Hepatic/Renal/Endo    (+) obesity,  GERD,  renal disease, diabetes mellitus,     Musculoskeletal     Abdominal   (+) obese,    Substance History      OB/GYN          Other      history of cancer                    Anesthesia Plan    ASA 2     general     intravenous induction     Anesthetic plan, risks, benefits, and alternatives have been provided, discussed and informed consent has been obtained with: patient.        CODE STATUS:

## 2023-02-20 NOTE — ANESTHESIA PROCEDURE NOTES
Airway  Urgency: elective    Date/Time: 2/20/2023 8:02 AM    General Information and Staff    Patient location during procedure: OR  Anesthesiologist: Samuel Smith MD  CRNA/CAA: Aarti Laws CRNA    Indications and Patient Condition    Preoxygenated: yes  Mask difficulty assessment: 1 - vent by mask    Final Airway Details  Final airway type: supraglottic airway      Successful airway: classic  Size 5     Number of attempts at approach: 1  Assessment: lips, teeth, and gum same as pre-op    Additional Comments  Pt to OR and positioned self to OR table. Monitors applied. PreO2: SIVI and easy insertion LMA. ETCO2 +, Equal and bilateral chest rise

## 2023-02-20 NOTE — OP NOTE
Operative Report     [facility]IN OR    Patient: Mega Mack  Age:      67 y.o.  :     1955  Sex:      male    Medical Record:  [id number]    Date of Operation/Procedure:  2023    Pre-op Diagnosis:   Urethral stricture    Post-Op Diagnosis Codes:  same    Pre-operative Diagnosis Free Text:  * No pre-op diagnosis entered *     Name of Operation/Procedure:      Cystoscopy, dilation of urethral stricture, catheter placement    Findings/Complications:      Complex but soft bulbar urethral stricture with small false passage  Dilated to 20 Fr, placed 16 Fr [place]  No complications    Description of procedure:     The patient was taken to the OR and placed under GA in lithotomy position.  The genitalia were prepped and draped in sterile fashion.  The 21 Fr cystoscope was introduced and pan-urethroscopy was performed using the 30 degree lens.  There was a bulbar stricture noted with a posterior false passage.  I passed a Sensor wire through the true lumen and confirmed the wire coiled in the bladder using fluoroscopy.  I then used the S curve dilators to dilate the stricture easily to 20 Fr - it was soft and quite flimsy.  I then tagged the wire to the drapes and replaced the cystoscope.  The stricture was widely patent, with the tissue showing radiation change.  The prostate had a mildly elevated bladder neck but was quite small overall.  Mild radiation change to the bladder mucosa but no tumors or stones were seen.  I then placed a 16 Fr [place] tip catheter over the wire and into the bladder without difficulty.  The urine was clear with no bleeding seen around the Fall.    Awakened and taken to RR in good condition, no complications.    Estimated Blood Loss: none    Specimens:  none    Fluids/Drains:  Fall    Samuel Lopez MD  2023  08:38 EST

## (undated) DEVICE — NITINOL WIRE WITH HYDROPHILIC TIP: Brand: SENSOR

## (undated) DEVICE — ST DIL URETH SCURVE 8TO20FR

## (undated) DEVICE — DRAINBAG,ANTI-REFLUX TOWER,L/F,2000ML,LL: Brand: MEDLINE

## (undated) DEVICE — GLV SURG BIOGEL LTX PF 8

## (undated) DEVICE — CATH FOL COUNCL 2WY 16F 5CC

## (undated) DEVICE — LOU CYSTO: Brand: MEDLINE INDUSTRIES, INC.

## (undated) DEVICE — TIDISHIELD UROLOGY DRAIN BAGS FROSTY VINYL STERILE FITS SIEMENS UROSKOP ACCESS 20 PER CASE: Brand: TIDISHIELD